# Patient Record
Sex: MALE | Race: WHITE | NOT HISPANIC OR LATINO | Employment: FULL TIME | URBAN - METROPOLITAN AREA
[De-identification: names, ages, dates, MRNs, and addresses within clinical notes are randomized per-mention and may not be internally consistent; named-entity substitution may affect disease eponyms.]

---

## 2020-07-18 ENCOUNTER — OFFICE VISIT (OUTPATIENT)
Dept: URGENT CARE | Facility: CLINIC | Age: 55
End: 2020-07-18
Payer: COMMERCIAL

## 2020-07-18 VITALS
BODY MASS INDEX: 35.65 KG/M2 | TEMPERATURE: 98.1 F | RESPIRATION RATE: 14 BRPM | WEIGHT: 269 LBS | OXYGEN SATURATION: 95 % | HEIGHT: 73 IN | SYSTOLIC BLOOD PRESSURE: 162 MMHG | DIASTOLIC BLOOD PRESSURE: 98 MMHG | HEART RATE: 100 BPM

## 2020-07-18 DIAGNOSIS — Z79.01 CHRONIC ANTICOAGULATION: ICD-10-CM

## 2020-07-18 DIAGNOSIS — R19.7 DIARRHEA, UNSPECIFIED TYPE: ICD-10-CM

## 2020-07-18 PROCEDURE — 99203 OFFICE O/P NEW LOW 30 MIN: CPT | Performed by: PHYSICIAN ASSISTANT

## 2020-07-18 RX ORDER — FUROSEMIDE 20 MG/1
TABLET ORAL
COMMUNITY
Start: 2020-06-22

## 2020-07-18 RX ORDER — WARFARIN SODIUM 2 MG/1
TABLET ORAL
COMMUNITY
Start: 2020-07-05

## 2020-07-18 RX ORDER — LISINOPRIL 20 MG/1
TABLET ORAL
COMMUNITY
Start: 2020-07-05

## 2020-07-18 RX ORDER — WARFARIN SODIUM 5 MG/1
TABLET ORAL
COMMUNITY
Start: 2020-06-22

## 2020-07-18 RX ORDER — GLIMEPIRIDE 2 MG/1
TABLET ORAL
COMMUNITY
Start: 2020-07-15

## 2020-07-18 RX ORDER — LIRAGLUTIDE 6 MG/ML
INJECTION SUBCUTANEOUS
COMMUNITY
Start: 2020-06-09

## 2020-07-18 ASSESSMENT — ENCOUNTER SYMPTOMS
MUSCULOSKELETAL NEGATIVE: 1
DIZZINESS: 0
COUGH: 0
EYE REDNESS: 0
CONSTIPATION: 0
FLATUS: 1
ARTHRALGIAS: 0
BLOOD IN STOOL: 1
BLOATING: 1
ABDOMINAL PAIN: 0
FEVER: 0
VOMITING: 0
SHORTNESS OF BREATH: 0
CHILLS: 0
FLANK PAIN: 0
DIARRHEA: 1
EYE DISCHARGE: 0
NAUSEA: 0
WEIGHT LOSS: 0

## 2020-07-18 NOTE — PROGRESS NOTES
Subjective:      Adolfo Amro is a 55 y.o. male who presents with Diarrhea (2 weeks ago, states there was blood in stool, went away last sunday, feels gassy )            Diarrhea    This is a new problem. The current episode started 1 to 4 weeks ago (2 weeks). The problem occurs 5 to 10 times per day (6-7). The problem has been unchanged. The patient states that diarrhea awakens him from sleep. Associated symptoms include bloating and increased flatus. Pertinent negatives include no abdominal pain, arthralgias, chills, coughing, fever, vomiting or weight loss. There are no known risk factors. He has tried nothing for the symptoms. There is no history of inflammatory bowel disease, irritable bowel syndrome, a recent abdominal surgery or short gut syndrome.     Patient presents to urgent care reporting a 2 week history of watery diarrhea, bloating, and increased flatus. There was bright red blood in the toilet for the first week of symptoms, but that has since resolved completely. He does admit to a history of hemorrhoids. He is also currently taking Coumadin for history of DVT in left leg 3 months ago. No fevers, chills, body aches, nausea, vomiting, abdominal pain, or urinary symptoms. No personal or family history of colon disease. He's never had a colonoscopy. He is currently staying in the area for the next 3 weeks for business. No recent international travel, suspect food intake, recent use of antibiotics, or sick contacts. No history of c diff. Surgical history is significant for abdominal hernia repair.     Review of Systems   Constitutional: Negative for chills, fever and weight loss.   HENT: Negative for congestion.    Eyes: Negative for discharge and redness.   Respiratory: Negative for cough and shortness of breath.    Cardiovascular: Negative for chest pain.   Gastrointestinal: Positive for bloating, blood in stool, diarrhea and flatus. Negative for abdominal pain, constipation, nausea and vomiting.  "  Genitourinary: Negative for dysuria, flank pain, frequency, hematuria and urgency.   Musculoskeletal: Negative.  Negative for arthralgias.   Skin: Negative for rash.   Neurological: Negative for dizziness.   All other systems reviewed and are negative.       Objective:     BP (!) 162/98 (BP Location: Left arm, Patient Position: Sitting, BP Cuff Size: Adult long)   Pulse 100   Temp 36.7 °C (98.1 °F) (Temporal)   Resp 14   Ht 1.854 m (6' 1\")   Wt 122 kg (269 lb)   SpO2 95%   BMI 35.49 kg/m²        Physical Exam  Vitals signs and nursing note reviewed.   Constitutional:       Appearance: Normal appearance. He is well-developed.   HENT:      Head: Normocephalic and atraumatic.      Right Ear: External ear normal.      Left Ear: External ear normal.      Nose: Nose normal.   Eyes:      Conjunctiva/sclera: Conjunctivae normal.      Pupils: Pupils are equal, round, and reactive to light.   Neck:      Musculoskeletal: Normal range of motion.   Cardiovascular:      Rate and Rhythm: Normal rate and regular rhythm.   Pulmonary:      Effort: Pulmonary effort is normal.   Abdominal:      General: Abdomen is flat. Bowel sounds are normal. There is no distension.      Tenderness: There is no abdominal tenderness. There is no right CVA tenderness, left CVA tenderness or guarding.   Musculoskeletal: Normal range of motion.   Skin:     General: Skin is warm and dry.   Neurological:      Mental Status: He is alert and oriented to person, place, and time.   Psychiatric:         Behavior: Behavior normal.          PMH:  has no past medical history on file.  MEDS:   Current Outpatient Medications:   •  warfarin (COUMADIN) 2 MG Tab, , Disp: , Rfl:   •  metFORMIN (GLUCOPHAGE) 850 MG Tab, , Disp: , Rfl:   •  metoprolol (LOPRESSOR) 25 MG Tab, , Disp: , Rfl:   •  furosemide (LASIX) 20 MG Tab, , Disp: , Rfl:   •  glimepiride (AMARYL) 2 MG Tab, , Disp: , Rfl:   •  VICTOZA 18 MG/3ML Solution Pen-injector, , Disp: , Rfl:   •  lisinopril " (PRINIVIL) 20 MG Tab, , Disp: , Rfl:   •  warfarin (COUMADIN) 5 MG Tab, , Disp: , Rfl:   ALLERGIES: No Known Allergies  SURGHX: History reviewed. No pertinent surgical history.  SOCHX:  reports that he has never smoked. He has never used smokeless tobacco. He reports previous alcohol use. He reports previous drug use.  FH: family history is not on file.       Assessment/Plan:       1. Diarrhea, unspecified type    - C DIFFICILE TOXINS A+B, EIA  - CULTURE STOOL; Future    Encouraged increased fluids to avoid dehydration. OTC imodium as needed and use of probiotic supplements. BRAT diet discussed. Will obtain stool studies at today's visit, pending results. Monitor symptoms closely and RTC or go to the ED for any persistent/worsening symptoms. The patient demonstrated a good understanding and agreed with the treatment plan.      2. Chronic anticoagulation    - Prothrombin Time; Future